# Patient Record
Sex: FEMALE | Race: BLACK OR AFRICAN AMERICAN
[De-identification: names, ages, dates, MRNs, and addresses within clinical notes are randomized per-mention and may not be internally consistent; named-entity substitution may affect disease eponyms.]

---
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
## 2018-06-17 ENCOUNTER — HOSPITAL ENCOUNTER (EMERGENCY)
Dept: HOSPITAL 62 - ER | Age: 18
Discharge: HOME | End: 2018-06-17
Payer: SELF-PAY

## 2018-06-17 VITALS — DIASTOLIC BLOOD PRESSURE: 92 MMHG | SYSTOLIC BLOOD PRESSURE: 131 MMHG

## 2018-06-17 DIAGNOSIS — N30.00: Primary | ICD-10-CM

## 2018-06-17 DIAGNOSIS — R30.0: ICD-10-CM

## 2018-06-17 LAB
APPEARANCE UR: CLEAR
APTT PPP: (no result) S
BILIRUB UR QL STRIP: NEGATIVE
CHLAM PCR: NOT DETECTED
GLUCOSE UR STRIP-MCNC: NEGATIVE MG/DL
GON PCR: NOT DETECTED
KETONES UR STRIP-MCNC: NEGATIVE MG/DL
NITRITE UR QL STRIP: NEGATIVE
PH UR STRIP: 6 [PH] (ref 5–9)
PROT UR STRIP-MCNC: NEGATIVE MG/DL
SP GR UR STRIP: 1
UROBILINOGEN UR-MCNC: NEGATIVE MG/DL (ref ?–2)

## 2018-06-17 PROCEDURE — 81025 URINE PREGNANCY TEST: CPT

## 2018-06-17 PROCEDURE — 87088 URINE BACTERIA CULTURE: CPT

## 2018-06-17 PROCEDURE — 99283 EMERGENCY DEPT VISIT LOW MDM: CPT

## 2018-06-17 PROCEDURE — 81001 URINALYSIS AUTO W/SCOPE: CPT

## 2018-06-17 PROCEDURE — 87591 N.GONORRHOEAE DNA AMP PROB: CPT

## 2018-06-17 PROCEDURE — 87491 CHLMYD TRACH DNA AMP PROBE: CPT

## 2018-06-17 PROCEDURE — 87086 URINE CULTURE/COLONY COUNT: CPT

## 2018-06-17 PROCEDURE — 87186 SC STD MICRODIL/AGAR DIL: CPT

## 2018-07-04 ENCOUNTER — HOSPITAL ENCOUNTER (EMERGENCY)
Dept: HOSPITAL 62 - ER | Age: 18
Discharge: HOME | End: 2018-07-04
Payer: SELF-PAY

## 2018-07-04 VITALS — DIASTOLIC BLOOD PRESSURE: 82 MMHG | SYSTOLIC BLOOD PRESSURE: 125 MMHG

## 2018-07-04 DIAGNOSIS — N39.0: Primary | ICD-10-CM

## 2018-07-04 DIAGNOSIS — B37.3: ICD-10-CM

## 2018-07-04 DIAGNOSIS — J45.909: ICD-10-CM

## 2018-07-04 DIAGNOSIS — R31.9: ICD-10-CM

## 2018-07-04 LAB
APPEARANCE UR: (no result)
APTT PPP: YELLOW S
BILIRUB UR QL STRIP: NEGATIVE
CHLAM PCR: NOT DETECTED
GLUCOSE UR STRIP-MCNC: NEGATIVE MG/DL
GON PCR: NOT DETECTED
KETONES UR STRIP-MCNC: NEGATIVE MG/DL
NITRITE UR QL STRIP: NEGATIVE
PH UR STRIP: 5 [PH] (ref 5–9)
PROT UR STRIP-MCNC: 100 MG/DL
RBCS (WET MOUNT): (no result)
SP GR UR STRIP: 1.02
T.VAGINALIS (WET MOUNT): (no result)
UROBILINOGEN UR-MCNC: 2 MG/DL (ref ?–2)
WBCS (WET MOUNT): (no result)
YEAST (WET MOUNT): (no result)

## 2018-07-04 PROCEDURE — 87086 URINE CULTURE/COLONY COUNT: CPT

## 2018-07-04 PROCEDURE — 87186 SC STD MICRODIL/AGAR DIL: CPT

## 2018-07-04 PROCEDURE — 81001 URINALYSIS AUTO W/SCOPE: CPT

## 2018-07-04 PROCEDURE — 81025 URINE PREGNANCY TEST: CPT

## 2018-07-04 PROCEDURE — 87210 SMEAR WET MOUNT SALINE/INK: CPT

## 2018-07-04 PROCEDURE — 87088 URINE BACTERIA CULTURE: CPT

## 2018-07-04 PROCEDURE — 99283 EMERGENCY DEPT VISIT LOW MDM: CPT

## 2018-07-04 PROCEDURE — 87591 N.GONORRHOEAE DNA AMP PROB: CPT

## 2018-07-04 PROCEDURE — 96372 THER/PROPH/DIAG INJ SC/IM: CPT

## 2018-07-04 PROCEDURE — 87491 CHLMYD TRACH DNA AMP PROBE: CPT

## 2018-07-04 NOTE — ER DOCUMENT REPORT
ED GI/





- General


Chief Complaint: Urinary Problem


Stated Complaint: URINARY PROBLEMS


Time Seen by Provider: 07/04/18 09:55


Mode of Arrival: Ambulatory


Information source: Patient


Notes: 





16-year-old female presents to ED for complaint of constant pressure on the 

bladder area.  She states she does not need to use the bathroom.  She states 

she had a UTI recently and used antibiotics and then now she has got the 

pressure again.  She states she does not know if the UTI was treated before 

properly.


TRAVEL OUTSIDE OF THE [place] IN LAST 30 DAYS: No





- HPI


Patient complains to provider of: Other - Bladder pain


Onset: Yesterday


Timing/Duration: Persistent


Quality of pain: Pressure


Severity in ED: None


Pain Level: Denies


Vaginal bleeding (Compared to normal period): None


Associated symptoms: Urinary urgency, Other - Pressure


Exacerbated by: Denies


Relieved by: Denies


Similar symptoms previously: Yes


Recently seen / treated by doctor: Yes





- Related Data


Allergies/Adverse Reactions: 


 





No Known Allergies Allergy (Verified 07/04/18 09:33)


 











Past Medical History





- General


Information source: Patient





- Social History


Smoking Status: Never Smoker


Cigarette use (# per day): No


Chew tobacco use (# tins/day): No


Smoking Education Provided: No


Frequency of alcohol use: None


Drug Abuse: Marijuana


Occupation: ididwork with: Parents


Family History: Reviewed & Not Pertinent


Patient has suicidal ideation: No


Patient has homicidal ideation: No





- Past Medical History


Cardiac Medical History: Reports: None


Pulmonary Medical History: Reports: Hx Asthma


EENT Medical History: Reports: None


Neurological Medical History: Reports: None


Endocrine Medical History: Reports: None


Renal/ Medical History: Reports: None


Malignancy Medical History: Reports: None


GI Medical History: Reports: None


Musculoskeltal Medical History: Reports None


Skin Medical History: Reports None


Psychiatric Medical History: Reports: None


Traumatic Medical History: Reports: None


Infectious Medical History: Reports: None


Surgical Hx: Negative


Past Surgical History: Reports: None





- Immunizations


Immunizations up to date: Yes





Review of Systems





- Review of Systems


Constitutional: No symptoms reported


EENT: No symptoms reported


Cardiovascular: No symptoms reported


Respiratory: No symptoms reported


Gastrointestinal: No symptoms reported


Genitourinary: Other - Pressure in her bladder even when she does not need to 

urinate


Female Genitourinary: No symptoms reported


Musculoskeletal: No symptoms reported


Skin: No symptoms reported


Hematologic/Lymphatic: No symptoms reported


Neurological/Psychological: No symptoms reported


-: Yes All other systems reviewed and negative





Physical Exam





- Vital signs


Vitals: 


 











Temp Pulse Resp BP Pulse Ox


 


 98.6 F   62   14   125/82   100 


 


 07/04/18 09:32  07/04/18 09:32  07/04/18 09:32  07/04/18 09:32  07/04/18 09:32











Interpretation: Normal





- General


General appearance: Appears well, Alert





- HEENT


Head: Normocephalic, Atraumatic


Eyes: Normal


Pupils: PERRL





- Respiratory


Respiratory status: No respiratory distress


Chest status: Nontender


Breath sounds: Normal


Chest palpation: Normal





- Cardiovascular


Rhythm: Regular


Heart sounds: Normal auscultation


Murmur: No





- Abdominal


Inspection: Normal


Distension: No distension


Bowel sounds: Normal


Tenderness: Nontender


Organomegaly: No organomegaly





- Genitourinary


External exam: Normal


Notes: 





Self swab for GC and chlamydia were completed





- Back


Back: Normal, Nontender





- Extremities


General upper extremity: Normal inspection, Nontender, Normal color, Normal ROM

, Normal temperature


General lower extremity: Normal inspection, Nontender, Normal color, Normal ROM

, Normal temperature, Normal weight bearing.  No: Helen's sign





- Neurological


Neuro grossly intact: Yes


Cognition: Normal


Orientation: AAOx4


[place] Coma Scale Eye Opening: Spontaneous


[place] Coma Scale Verbal: Oriented


[place] Coma Scale Motor: Obeys Commands


[place] Coma Scale Total: 15


Speech: Normal


Motor strength normal: LUE, RUE, LLE, RLE


Sensory: Normal





- Psychological


Associated symptoms: Normal affect, Normal mood





- Skin


Skin Temperature: Warm


Skin Moisture: Dry


Skin Color: Normal





Course





- Re-evaluation


Re-evalutation: 





07/04/18 21:40


Patient requested STD check in with her urine for her urinary symptoms.  

Patient was treated with azithromycin and Rocephin for the GC and chlamydia 

test as she was not going to wait for the test.  She was also treated for a UTI 

with Bactrim.  Urine culture was also sent to ensure that proper treatment of 

the UTI.  Patient was sent home with a prescription for Bactrim and instructed 

to follow-up with her primary doctor to have a repeat of the urine.  Patient 

was able to verbalize understanding of instructions and agreement with 

treatment plan.





- Vital Signs


Vital signs: 


 











Temp Pulse Resp BP Pulse Ox


 


 98.6 F   62   14   125/82   100 


 


 07/04/18 09:32  07/04/18 09:32  07/04/18 09:32  07/04/18 09:32  07/04/18 09:32














- Laboratory


Laboratory results interpreted by me: 


 











  07/04/18





  10:10


 


Urine Protein  100 H


 


Urine Blood  MODERATE H


 


Urine Urobilinogen  2.0 H


 


Ur Leukocyte Esterase  LARGE H














Discharge





- Discharge


Clinical Impression: 


 Yeast vaginitis





UTI (urinary tract infection)


Qualifiers:


 Urinary tract infection type: site unspecified Hematuria presence: with 

hematuria Qualified Code(s): N39.0 - Urinary tract infection, site not specified





Vaginitis


Qualifiers:


 Chronicity: acute Qualified Code(s): N76.0 - Acute vaginitis





Condition: Stable


Disposition: HOME, SELF-CARE


Instructions:  Family Physicians / Practices


Additional Instructions: 


URINARY TRACT INFECTION:





     Your evaluation indicates that you have a urinary tract infection. This is 

due to germs growing in the bladder.  This is a common problem.


     This infection usually responds quickly to antibiotics.  Your antibiotic 

should be taken exactly as prescribed.  Drink plenty of fluids -- three to four 

quarts a day.


     Occasionally, a bladder anesthetic will be prescribed to help stop the 

feeling of urgency until the antibiotic has a chance to clear the infection.  

This may cause your urine to be dark orange.


     Certain urine infections require a culture.  If the doctor obtained a 

culture, the results will be back in two days.  You should call to see if a 

change in treatment is needed.


     A repeat urinalysis after you finish treatment is often recommended.  The 

physician will let you know if further testing is required.


     Call the doctor if you develop fever, chills, flank pain, inability to 

urinate, or blood in the urine.





VAGINITIS:





     Your exam shows that you have vaginitis, a vaginal infection.  The 

infection can be caused by a many different organisms, including trichomonas or 

Gardnerella.  The usual symptoms are vaginal irritation and discharge.


     The treatment is usually antibiotics such as Flagyl.  Laboratory tests can 

determine which germ is responsible.


     Use the medication as prescribed.  Because this infection can be 

transmitted sexually, your sexual partner may need to be checked and treated 

also.  If your physician has not discussed this with you, please check before 

resuming sexual relations.  If a culture shows gonorrhea or chlamydia, the 

infection must be reported to the health department.


     Call the doctor if you develop pelvic pain, fever, or problems with 

urination, or if you don't improve as expected.





VAGINAL YEAST INFECTION:





     You have evidence of a yeast infection -- called "candida."  A vaginal 

yeast infection often causes itching and discharge.  While not dangerous, it 

can be very unpleasant.  A yeast infection often follows the use of powerful 

antibiotics.  It is more likely to occur in diabetics.


     The treatment now is usually a single pill of Diflucan, but also an 

antifungal cream or suppository may be used for a few days.  You do not need to 

avoid sexual intercourse.


     Recurrences are common.  You can make a recurrence less likely by wearing 

cotton underwear and avoiding tight clothing.  For mild recurrences, you can 

try over-the-counter creams or suppositories that are made specifically for 

yeast.


     If the symptoms do not resolve, you should follow up for re-examination.  

Sometimes treatment of the sexual partner is necessary if infections are 

recurrent.








CEPHALOSPORINS:


     An antibiotic of the cephalosporin class has been prescribed. This type of 

antibiotic covers a wide variety of infections, including those of the skin, 

lungs, middle ear, and urinary tract.


     This antibiotic is somewhat similar to the penicillin family. In rare cases

, a person who is allergic to penicillin will also be allergic to this 

medication.  If you have had a severe allergic reaction to penicillin, and have 

not taken this antibiotic since that time, notify your doctor.


     Antibiotics which cover many germs ("broad spectrum" antibiotics) are more 

likely to cause diarrhea or "yeast" infections.  Women prone to vaginal yeast 

problems may suffer an attack after taking this antibiotic.  In infants, oral 

thrush (white spots "stuck" on the cheek) or yeast diaper rash may result.  See 

your doctor if these problems occur.


     Call the doctor at once if you develop hives, itching, shortness of breath

, or lightheadedness.








AZITHROMYCIN:


     Azithromycin (Zithromax) is a broad spectrum antibiotic in the same class 

as erythromycin.  It can treat a variety of bacterial infections, but is most 

frequently used for respiratory infections.


     Azithromycin is extremely long-lasting.  It accumulates in body tissues 

and continues to kill bacteria for many days.


     In order to improve absorption, Azithromycin should be taken at least one 

hour before or two hours after a meal.  It does not have the same strong 

tendency to upset the stomach as erythromycin and is usually very well 

tolerated.


     Patients who have had a rash or other true allergic reactions to 

erythromycin should not take this medication.  Call if you develop 

gastrointestinal distress, severe diarrhea, rash, hives, itching, or shortness 

of breath.





FLUCONAZOLE: You need to take the Diflucan tomorrow and in 1 week.


     Fluconazole (Diflucan) is an antifungal drug.  It is useful for serious 

fungal infections, but is also excellent for oral or vaginal yeast infections.


     Diflucan interacts with some medicines.  This is a concern if you are 

taking anticoagulants (such as Coumadin), phenytoin (Dilantin), cyclosporin, or 

oral hypoglycemics (such as tolbutamide, Orinase, glipizide, Glucotrol, 

glyburide, DiaBeta, Glynase, and Micronase).  Be sure the doctor knows if you 

are taking one of these medicines.


     We don't know how Diflucan affects pregnancy.  If you are planning to 

become pregnant, discuss this with your doctor.


     Diflucan has few side effects.  Minor side effects may include nausea, 

headache, or diarrhea.  Call the doctor if you develop a skin rash, shortness 

of breath, or other new symptoms.








TRIMETHOPRIM-SULFA:


     You have been given a prescription for trimethoprim-sulfa (TMS, Septra, 

Bactrim).  This is a combination antibiotic of the sulfa class, often used for 

urinary tract infections, middle ear infections, bronchitis, shigella 

intestinal infection, and Pneumocystis pneumonia.


     TMS is usually well-tolerated.  Occasional side effects include nausea and 

decreased appetite.  Septra is not recommended for infants less than two months 

of age.  Do not take this medication if you have experienced severe side 

effects or allergy to sulfa medicine.


     You should stop this medicine at once and contact your physician if you 

develop any rash, joint pain, shortness of breath, bruising, or jaundice (

yellow color in the skin), or if you develop any other new or unusual symptoms.





FOLLOW-UP CARE:


If you have been referred to a physician for follow-up care, call the physician

s office for an appointment as you were instructed or within the next two days.

  If you experience worsening or a significant change in your symptoms, notify 

the physician immediately or return to the Emergency Department at any time for 

re-evaluation.


Prescriptions: 


Fluconazole [Diflucan] 150 mg PO ONCE #2 tablet


Sulfamethoxazole/Trimethoprim [Bactrim Ds Tablet] 1 each PO BID #14 tablet

## 2019-08-23 ENCOUNTER — HOSPITAL ENCOUNTER (EMERGENCY)
Dept: HOSPITAL 62 - ER | Age: 19
Discharge: HOME | End: 2019-08-23
Payer: SELF-PAY

## 2019-08-23 VITALS — SYSTOLIC BLOOD PRESSURE: 119 MMHG | DIASTOLIC BLOOD PRESSURE: 82 MMHG

## 2019-08-23 DIAGNOSIS — J45.909: ICD-10-CM

## 2019-08-23 DIAGNOSIS — R35.0: ICD-10-CM

## 2019-08-23 DIAGNOSIS — N76.0: Primary | ICD-10-CM

## 2019-08-23 DIAGNOSIS — B96.89: ICD-10-CM

## 2019-08-23 DIAGNOSIS — R50.9: ICD-10-CM

## 2019-08-23 DIAGNOSIS — R11.2: ICD-10-CM

## 2019-08-23 DIAGNOSIS — M79.10: ICD-10-CM

## 2019-08-23 DIAGNOSIS — N30.01: ICD-10-CM

## 2019-08-23 DIAGNOSIS — R10.10: ICD-10-CM

## 2019-08-23 DIAGNOSIS — R51: ICD-10-CM

## 2019-08-23 DIAGNOSIS — R10.30: ICD-10-CM

## 2019-08-23 DIAGNOSIS — H92.02: ICD-10-CM

## 2019-08-23 LAB
ABSOLUTE LYMPHOCYTES# (MANUAL): 1.5 10^3/UL (ref 0.5–4.7)
ABSOLUTE MONOCYTES # (MANUAL): 1.4 10^3/UL (ref 0.1–1.4)
ADD MANUAL DIFF: YES
ALBUMIN SERPL-MCNC: 3.8 G/DL (ref 3.5–5)
ALP SERPL-CCNC: 47 U/L (ref 38–126)
ANION GAP SERPL CALC-SCNC: 10 MMOL/L (ref 5–19)
ANISOCYTOSIS BLD QL SMEAR: (no result)
APPEARANCE UR: (no result)
APTT PPP: YELLOW S
AST SERPL-CCNC: 18 U/L (ref 14–36)
BACTERIA (WET MOUNT): (no result)
BASOPHILS NFR BLD MANUAL: 1 % (ref 0–2)
BILIRUB DIRECT SERPL-MCNC: 0.4 MG/DL (ref 0–0.4)
BILIRUB SERPL-MCNC: 0.4 MG/DL (ref 0.2–1.3)
BILIRUB UR QL STRIP: NEGATIVE
BUN SERPL-MCNC: 6 MG/DL (ref 7–20)
CALCIUM: 9.5 MG/DL (ref 8.4–10.2)
CHLAM PCR: NOT DETECTED
CHLORIDE SERPL-SCNC: 102 MMOL/L (ref 98–107)
CO2 SERPL-SCNC: 25 MMOL/L (ref 22–30)
EOSINOPHIL NFR BLD MANUAL: 1 % (ref 0–6)
EPITHELIALS (WET MOUNT): (no result)
ERYTHROCYTE [DISTWIDTH] IN BLOOD BY AUTOMATED COUNT: 17.2 % (ref 11.5–14)
GLUCOSE SERPL-MCNC: 94 MG/DL (ref 75–110)
GLUCOSE UR STRIP-MCNC: NEGATIVE MG/DL
HCT VFR BLD CALC: 31.5 % (ref 36–47)
HGB BLD-MCNC: 10.4 G/DL (ref 12–15.5)
KETONES UR STRIP-MCNC: 20 MG/DL
MCH RBC QN AUTO: 27.3 PG (ref 27–33.4)
MCHC RBC AUTO-ENTMCNC: 32.9 G/DL (ref 32–36)
MCV RBC AUTO: 83 FL (ref 80–97)
MONOCYTES % (MANUAL): 15 % (ref 3–13)
NITRITE UR QL STRIP: NEGATIVE
OVALOCYTES BLD QL SMEAR: SLIGHT
PH UR STRIP: 6 [PH] (ref 5–9)
PLATELET # BLD: 214 10^3/UL (ref 150–450)
PLATELET COMMENT: ADEQUATE
POTASSIUM SERPL-SCNC: 3.3 MMOL/L (ref 3.6–5)
PROT SERPL-MCNC: 7.1 G/DL (ref 6.3–8.2)
PROT UR STRIP-MCNC: 30 MG/DL
RBC # BLD AUTO: 3.79 10^6/UL (ref 3.72–5.28)
RBCS (WET MOUNT): (no result)
SEGMENTED NEUTROPHILS % (MAN): 67 % (ref 42–78)
SP GR UR STRIP: 1.01
T.VAGINALIS (WET MOUNT): (no result)
TOTAL CELLS COUNTED BLD: 100
UROBILINOGEN UR-MCNC: NEGATIVE MG/DL (ref ?–2)
VARIANT LYMPHS NFR BLD MANUAL: 16 % (ref 13–45)
WBC # BLD AUTO: 9.3 10^3/UL (ref 4–10.5)
WBC TOXIC VACUOLES BLD QL SMEAR: PRESENT
WBCS (WET MOUNT): (no result)
YEAST (WET MOUNT): (no result)

## 2019-08-23 PROCEDURE — 85025 COMPLETE CBC W/AUTO DIFF WBC: CPT

## 2019-08-23 PROCEDURE — 96365 THER/PROPH/DIAG IV INF INIT: CPT

## 2019-08-23 PROCEDURE — 93005 ELECTROCARDIOGRAM TRACING: CPT

## 2019-08-23 PROCEDURE — 87186 SC STD MICRODIL/AGAR DIL: CPT

## 2019-08-23 PROCEDURE — 99284 EMERGENCY DEPT VISIT MOD MDM: CPT

## 2019-08-23 PROCEDURE — 80053 COMPREHEN METABOLIC PANEL: CPT

## 2019-08-23 PROCEDURE — 87591 N.GONORRHOEAE DNA AMP PROB: CPT

## 2019-08-23 PROCEDURE — 87491 CHLMYD TRACH DNA AMP PROBE: CPT

## 2019-08-23 PROCEDURE — 36415 COLL VENOUS BLD VENIPUNCTURE: CPT

## 2019-08-23 PROCEDURE — 84703 CHORIONIC GONADOTROPIN ASSAY: CPT

## 2019-08-23 PROCEDURE — 87086 URINE CULTURE/COLONY COUNT: CPT

## 2019-08-23 PROCEDURE — 93010 ELECTROCARDIOGRAM REPORT: CPT

## 2019-08-23 PROCEDURE — 81001 URINALYSIS AUTO W/SCOPE: CPT

## 2019-08-23 PROCEDURE — 87077 CULTURE AEROBIC IDENTIFY: CPT

## 2019-08-23 PROCEDURE — 87040 BLOOD CULTURE FOR BACTERIA: CPT

## 2019-08-23 PROCEDURE — 96375 TX/PRO/DX INJ NEW DRUG ADDON: CPT

## 2019-08-23 PROCEDURE — 87088 URINE BACTERIA CULTURE: CPT

## 2019-08-23 PROCEDURE — 87210 SMEAR WET MOUNT SALINE/INK: CPT

## 2019-08-23 PROCEDURE — 82962 GLUCOSE BLOOD TEST: CPT

## 2019-08-23 PROCEDURE — 71046 X-RAY EXAM CHEST 2 VIEWS: CPT

## 2019-08-23 NOTE — ER DOCUMENT REPORT
ED Medical Screen (RME)





- General


Chief Complaint: Upper Abdominal Pain


Stated Complaint: LEFT EAR PAIN,NAUSEA


Time Seen by Provider: 08/23/19 10:04


Mode of Arrival: Ambulatory


Information source: Patient


Notes: 





19-year-old female presented to ED for complaint of sweating chills body aches 

all over this started on Sunday.  She states she also had headaches but she does

not have a headache at this time.  She states she has been nauseated with some 

days vomiting and but not today.  She is nauseated today she has had some 

frequency and urgency with urine but no burning with urination.  She states she 

has had a white vaginal discharge that is developed a strong odor.  Last 

menstrual period was August 1.  Patient is alert oriented respirations regular 

and unlabored speaking with a full sentences.  She does have a temperature of 

102 and a pulse of 108.  I have started the sepsis work-up on her.





I have greeted and performed a rapid initial assessment of this patient.  A 

comprehensive ED assessment and evaluation of the patient, analysis of test 

results and completion of medical decision making process will be conducted by 

an additional ED providers.


TRAVEL OUTSIDE OF THE [place] IN LAST 30 DAYS: No





- Related Data


Allergies/Adverse Reactions: 


                                        





No Known Allergies Allergy (Verified 08/23/19 09:55)


   











Past Medical History





- Social History


Chew tobacco use (# tins/day): No


Frequency of alcohol use: None


Drug Abuse: Marijuana


Pulmonary Medical History: Reports: Hx Asthma


Renal/ Medical History: Denies: Hx Peritoneal Dialysis





- Immunizations


Immunizations up to date: Yes





Physical Exam





- Vital signs


Vitals: 





                                        











Temp Pulse Resp BP Pulse Ox


 


 102.0 F H  108 H  16   113/72   97 


 


 08/23/19 10:01  08/23/19 10:01  08/23/19 10:01  08/23/19 10:01  08/23/19 10:01














Course





- Vital Signs


Vital signs: 





                                        











Temp Pulse Resp BP Pulse Ox


 


 102.0 F H  108 H  16   113/72   97 


 


 08/23/19 10:01  08/23/19 10:01  08/23/19 10:01  08/23/19 10:01  08/23/19 10:01

## 2019-08-23 NOTE — RADIOLOGY REPORT (SQ)
EXAM DESCRIPTION:  CHEST 2 VIEWS



COMPLETED DATE/TIME:  8/23/2019 11:03 am



REASON FOR STUDY:  sepsis protocol



COMPARISON:  4/25/2011



EXAM PARAMETERS:  NUMBER OF VIEWS: two views

TECHNIQUE: Digital Frontal and Lateral radiographic views of the chest acquired.

RADIATION DOSE: NA

LIMITATIONS: none



FINDINGS:  LUNGS AND PLEURA: No opacities, masses or pneumothorax. No pleural effusion.

MEDIASTINUM AND HILAR STRUCTURES: No masses or contour abnormalities.

HEART AND VASCULAR STRUCTURES: Heart normal size.  No evidence for failure.

BONES: No acute findings.

HARDWARE: None in the chest.

OTHER: No other significant finding.



IMPRESSION:  NO ACUTE RADIOGRAPHIC FINDING IN THE CHEST.



TECHNICAL DOCUMENTATION:  JOB ID:  [id number]

 2011 [employer]- All Rights Reserved



Reading location - IP/workstation name: [email or web address]

## 2019-08-23 NOTE — ER DOCUMENT REPORT
ED General





- General


Chief Complaint: Upper Abdominal Pain


Stated Complaint: LEFT EAR PAIN,NAUSEA


Time Seen by Provider: 08/23/19 10:04


Mode of Arrival: Ambulatory


TRAVEL OUTSIDE OF THE [place] IN LAST 30 DAYS: No





- HPI


Notes: 





19-year-old female to the emergency department with complaints of body aches, 

urinary frequency, suprapubic pain, vaginal discharge, fevers, nausea and vomi

ting, headaches that began 5 days ago.  Patient states that she last vomited on 

Tuesday but has had nausea since.  She states that she has been avoiding food 

because of the nausea.  She states that she has had a little bit of low back 

pain on the right side but denies kacey flank pain.  States that she is been 

trying to take ibuprofen at home for her fevers but it is not helped.  States 

that she is having both chills and sweats.  Denies any cough, chest pain, 

shortness of breath.  She states that she has headache and left ear pain as 

well.  States that she has low concern for an STD with her vaginal discharge.  

She states that she was tested for STDs last week and was negative.  Her last 

menstrual period was August 1.





- Related Data


Allergies/Adverse Reactions: 


                                        





No Known Allergies Allergy (Verified 08/23/19 09:55)


   











Past Medical History





- General


Information source: Patient





- Social History


Smoking Status: Never Smoker


Chew tobacco use (# tins/day): No


Frequency of alcohol use: None


Drug Abuse: Marijuana


Family History: Reviewed & Not Pertinent


Patient has suicidal ideation: No


Patient has homicidal ideation: No


Pulmonary Medical History: Reports: Hx Asthma


Renal/ Medical History: Denies: Hx Peritoneal Dialysis





- Immunizations


Immunizations up to date: Yes





Review of Systems





- Review of Systems


Constitutional: Chills, Fever, Malaise


EENT: No symptoms reported


Cardiovascular: denies: Chest pain, Palpitations, Dyspnea, Syncope, Dizziness, 

Lightheaded


Respiratory: denies: Cough, Short of breath


Gastrointestinal: Abdominal pain, Nausea, Vomiting.  denies: Diarrhea


Genitourinary: Frequency, Urgency.  denies: Flank pain


Female Genitourinary: Vaginal discharge, Vaginal odor.  denies: Vaginal bleeding


Musculoskeletal: No symptoms reported


Skin: No symptoms reported


Hematologic/Lymphatic: No symptoms reported


Neurological/Psychological: Headaches


-: Yes All other systems reviewed and negative





Physical Exam





- Vital signs


Vitals: 


                                        











Temp Pulse Resp BP Pulse Ox


 


 102.0 F H  108 H  16   113/72   97 


 


 08/23/19 10:01  08/23/19 10:01  08/23/19 10:01  08/23/19 10:01  08/23/19 10:01











Interpretation: Tachycardic, Febrile





- General


General appearance: Appears well


In distress: None


Notes: 





patient is eating [employer] food





- HEENT


Head: Normocephalic, Atraumatic


Eyes: Normal


Pupils: PERRL


Ears: Normal


External canal: Normal


Tympanic membrane: Normal.  No: Bulging, Hemotympanum, Injected


Sinus: Normal


Nasal: Normal


Mouth/Lips: Normal


Mucous membranes: Normal


Pharynx: Normal


Neck: Normal





- Respiratory


Respiratory status: No respiratory distress


Chest status: Nontender


Breath sounds: Normal


Chest palpation: Normal





- Cardiovascular


Rhythm: Regular


Heart sounds: Normal auscultation


Murmur: No





- Abdominal


Inspection: Normal


Distension: No distension


Bowel sounds: Normal


Tenderness: Tender - There is tenderness to palpation over the suprapubic 

abdomen.  Negative McBurney's point, negative Rovsing's.  No CVA tenderness.  

There is no rebound, no guarding.  Abdomen is soft


Organomegaly: No organomegaly -  and there is no distention.





- Genitourinary


Notes: 





Patient declines pelvic exam and would like to self swab.





- Back


Back: Normal, Nontender.  No: CVA tenderness





- Neurological


Neuro grossly intact: Yes


Cognition: Normal


Orientation: AAOx4


[place] Coma Scale Eye Opening: Spontaneous


[place] Coma Scale Verbal: Oriented


[place] Coma Scale Motor: Obeys Commands


[place] Coma Scale Total: 15


Speech: Normal


Motor strength normal: LUE, RUE, LLE, RLE


Sensory: Normal





- Psychological


Associated symptoms: Normal affect, Normal mood





- Skin


Skin Temperature: Warm


Skin Moisture: Dry


Skin Color: Normal





Course





- Vital Signs


Vital signs: 


                                        











Temp Pulse Resp BP Pulse Ox


 


 99.2 F   96   16   119/82   100 


 


 08/23/19 14:24  08/23/19 14:24  08/23/19 14:24  08/23/19 14:24  08/23/19 14:24











Noted improved vital signs





- Laboratory


Result Diagrams: 


                                 08/23/19 11:43





                                 08/23/19 11:43


Laboratory results interpreted by me: 


                                        











  08/23/19 08/23/19 08/23/19





  10:25 11:43 11:43


 


Hgb   10.4 L 


 


Hct   31.5 L 


 


RDW   17.2 H 


 


Monocytes % (Manual)   15 H 


 


Sodium    136.6 L


 


Potassium    3.3 L


 


BUN    6 L


 


Urine Protein  30 H  


 


Urine Ketones  20 H  


 


Urine Blood  SMALL H  


 


Ur Leukocyte Esterase  LARGE H  














- Diagnostic Test


Radiology reviewed: Image reviewed, Reports reviewed





- EKG Interpretation by Me


EKG shows normal: Sinus rhythm


Rate: Normal


Rhythm: NSR


When compared to previous EKG there are: Previous EKG unavailable


Additional EKG results interpreted by me: 





08/23/19 13:56


No STEMI no prior for comparison.  Normal axis nonspecific T wave changes.





Discharge





- Discharge


Clinical Impression: 


 Bacterial vaginosis, Generalized body aches





UTI (urinary tract infection)


Qualifiers:


 Urinary tract infection type: acute cystitis Hematuria presence: with hematuria

Qualified Code(s): N30.01 - Acute cystitis with hematuria





Fever


Qualifiers:


 Fever type: unspecified Qualified Code(s): R50.9 - Fever, unspecified





Vomiting


Qualifiers:


 Vomiting type: unspecified Vomiting Intractability: non-intractable Nausea 

presence: with nausea Qualified Code(s): R11.2 - Nausea with vomiting, 

unspecified





Condition: Stable


Disposition: HOME, SELF-CARE


Instructions:  Urinary Tract Infection ([facility]), Vomiting ([facility])


Additional Instructions: 


Complete all antibiotics.  Push fluids.  Rest at home.  Take Tylenol and Motrin 

for any further fevers.  Return immediately if your symptoms worsen with 

worsening abdominal pain, chest pain, shortness of breath, passing out, or any 

other concerns.  Follow-up with primary care without fail.  [place] diet


Prescriptions: 


Fluconazole [Diflucan] 150 mg PO ONCE PRN #1 tablet


 PRN Reason: 


Metronidazole [Flagyl 500 mg Tablet] 500 mg PO BID #14 tablet


Cephalexin Monohydrate [Keflex 500 mg Capsule] 500 mg PO BID #14 capsule


Ondansetron [Zofran Odt 4 mg Tablet] 1 - 2 tab PO Q4H PRN #15 tab.rapdis


 PRN Reason: For Nausea/Vomiting

## 2025-04-14 NOTE — ER DOCUMENT REPORT
Called pt to triage   left message to return call     Pt states she has been taking Votaren BID and wearing her bra 24 hours but having increased pain to breast 7/10 starting Saturday  Pt asking for a different pain medication and more time off of work    ED General





- General


Chief Complaint: Urinary Problem


Stated Complaint: PAINFUL URINATION


Time Seen by Provider: 06/17/18 20:32


Notes: 





Patient is a 17-year-old female without chronic medical problems who presents 

with 2-3 days of dysuria.  Patient reports a is a burning, stinging pain that 

is present at the end of when she urinates.  Nothing improves or worsens this 

discomfort.  She denies any localized abdominal pain.  She denies any vaginal 

bleeding or discharge.  She states she is concerned about the possibility of a 

sexually transmitted infection although notes she does not have any symptoms to 

suggest this diagnosis.  She has not seen her primary doctor regarding today's 

concerns.  She has a history of similar symptoms in the past when she has had 

urinary tract infections.  She denies any associated fever or constitutional 

symptoms.


TRAVEL OUTSIDE OF THE [place] IN LAST 30 DAYS: No





- Related Data


Allergies/Adverse Reactions: 


 





No Known Allergies Allergy (Unverified 12/24/17 16:26)


 











Past Medical History





- General


Information source: Patient





- Social History


Smoking Status: Never Smoker


Chew tobacco use (# tins/day): No


Frequency of alcohol use: None


Drug Abuse: None


Lives with: Alone


Family History: Reviewed & Not Pertinent


Patient has suicidal ideation: No


Patient has homicidal ideation: No


Pulmonary Medical History: Reports: Hx Asthma


Renal/ Medical History: Denies: Hx Peritoneal Dialysis





- Immunizations


Immunizations up to date: Yes





Review of Systems





- Review of Systems


Notes: 





Constitutional: Negative for fever.


HENT: Negative for sore throat.


Eyes: Negative for visual changes.


Cardiovascular: Negative for chest pain.


Respiratory: Negative for shortness of breath.


Gastrointestinal: Negative for abdominal pain, vomiting or diarrhea.


Genitourinary: Positive for dysuria.


Musculoskeletal: Negative for back pain.


Skin: Negative for rash.


Neurological: Negative for headaches, weakness or numbness.





10 point ROS negative except as marked above and in HPI.





Physical Exam





- Vital signs


Vitals: 


 











Temp Pulse Resp BP Pulse Ox


 


 98.6 F   79   20   134/86 H  97 


 


 06/17/18 19:53  06/17/18 19:53  06/17/18 19:53  06/17/18 19:53  06/17/18 19:53











Interpretation: Normal


Notes: 





PHYSICAL EXAMINATION:





GENERAL: Well-appearing, well-nourished and in no acute distress.





HEAD: Atraumatic, normocephalic.





EYES: Pupils equal round and reactive to light, extraocular movements intact, 

sclera anicteric, conjunctiva are normal.





ENT: nares patent, oropharynx clear without exudates.  Moist mucous membranes.





NECK: Normal range of motion, supple without lymphadenopathy





LUNGS: Breath sounds clear to auscultation bilaterally and equal.  No wheezes 

rales or rhonchi.





HEART: Regular rate and rhythm without murmurs





ABDOMEN: Soft, nontender, normoactive bowel sounds.  No guarding, no rebound.  

No masses appreciated.





EXTREMITIES: Normal range of motion, no pitting or edema.  No cyanosis.





NEUROLOGICAL: No focal neurological deficits. Moves all extremities 

spontaneously and on command.





PSYCH: Normal mood, normal affect.





SKIN: Warm, Dry, normal turgor, no rashes or lesions noted.





Course





- Re-evaluation


Re-evalutation: 





06/17/18 21:50


Patient presents with symptoms consistent with an acute cystitis. Vitals wnl. 

No history of fever, flank pain, or constitution symptoms to suggest ascending 

infection at this time. Patient is well in appearance, tolerating oral intake 

without difficulty.  No focal abdominal tenderness to suggest acute appendicitis

, biliary pathology, acute pancreatitis, tubo-ovarian abscesses, or pelvic 

inflammatory disease.  Patient will be started on antibiotics at this time. A 

culture has been sent.  At this time will discharge with return precautions and 

follow-up recommendations.  Verbal discharge instructions given a the bedside 

and opportunity for questions given. Medication warnings reviewed. Patient is 

in agreement with this plan and has verbalized understanding of return 

precautions and the need for primary care follow-up in the next 24-72 hours.





- Vital Signs


Vital signs: 


 











Temp Pulse Resp BP Pulse Ox


 


 98.0 F   84   16   131/92 H  100 


 


 06/17/18 22:00  06/17/18 22:00  06/17/18 22:00  06/17/18 22:00  06/17/18 22:00














- Laboratory


Laboratory results interpreted by me: 


 











  06/17/18





  20:52


 


Urine Blood  MODERATE H


 


Ur Leukocyte Esterase  MODERATE H














Discharge





- Discharge


Clinical Impression: 


 Dysuria





Urinary tract infection


Qualifiers:


 Urinary tract infection type: acute cystitis Hematuria presence: without 

hematuria Qualified Code(s): N30.00 - Acute cystitis without hematuria





Condition: Good


Disposition: HOME, SELF-CARE


Additional Instructions: 


Your urine shows findings consistent with a urinary tract infection.  Please 

take all the antibiotics as directed even if your symptoms have improved.  

Please follow-up with your primary care physician as needed.  Return to 

emergency room if you develop fever >101F, persistent vomiting, become lethargic

, have severe pain in your sides, or any other symptoms that are concerning to 

you.


Prescriptions: 


Cephalexin Monohydrate [Keflex 500 mg Capsule] 500 mg PO Q6H 5 Days  capsule